# Patient Record
(demographics unavailable — no encounter records)

---

## 2025-04-15 NOTE — REASON FOR VISIT
[Symptom and Test Evaluation] : symptom and test evaluation [FreeTextEntry1] : 4M comes in for a follow up and re-evaluation. No chest pain noted. He will be having dental in the coming months. No issues with meds. Blood pressure seems to be elevated. No issues with meds.  Increased stress and dyspnea noted.

## 2025-04-15 NOTE — DISCUSSION/SUMMARY
[FreeTextEntry1] : SOB s/p MV repair check echo if able to approve and schedule. HTN cont current medications in coreg  HLD ALVARO and I discussed his lipid panel and individualized target LDL goal. At this point, will do diet and exercise with anticipation of re-evaluating labs in 3-6 months Dental work script for amoxicillin sent.  EKG section of the chart --- secondary to symptoms above an electrocardiogram also known as an EKG was performed.  Risks and benefits discussed with the patient. Patient was given time and privacy to changed into a gown. Shortly after, standard 10 leads were applied and a ITN system was used to perform the study. The results were subsequently reviewed by attending physician and discussed with the patient. The study showed a normal sinus rhythm and no ST-T suggestive of ischemia. Order for the EKG was placed in the chart. The results were documented. Billing submitted. [EKG obtained to assist in diagnosis and management of assessed problem(s)] : EKG obtained to assist in diagnosis and management of assessed problem(s)

## 2025-06-20 NOTE — REASON FOR VISIT
[Symptom and Test Evaluation] : symptom and test evaluation [FreeTextEntry1] : 47M comes in for a follow up visit. No chest pain noted. His blood pressure has been a little elevated. We increased coreg with some improvement noted.  Lipids have been an issue as well.  Echo completed.

## 2025-06-20 NOTE — DISCUSSION/SUMMARY
[FreeTextEntry1] : s/p MV repair echo reviewed. conservative follow up HTN cont current medications. blood pressure improving. will add norvasc and a low Na diet HLD check carotid us, would like to get labs repeated with PMD in the next few months or so.